# Patient Record
Sex: FEMALE | Race: AMERICAN INDIAN OR ALASKA NATIVE
[De-identification: names, ages, dates, MRNs, and addresses within clinical notes are randomized per-mention and may not be internally consistent; named-entity substitution may affect disease eponyms.]

---

## 2018-03-17 ENCOUNTER — HOSPITAL ENCOUNTER (EMERGENCY)
Dept: HOSPITAL 5 - ED | Age: 48
LOS: 1 days | Discharge: HOME | End: 2018-03-18
Payer: MEDICARE

## 2018-03-17 VITALS — SYSTOLIC BLOOD PRESSURE: 123 MMHG | DIASTOLIC BLOOD PRESSURE: 72 MMHG

## 2018-03-17 DIAGNOSIS — K21.9: ICD-10-CM

## 2018-03-17 DIAGNOSIS — Z91.040: ICD-10-CM

## 2018-03-17 DIAGNOSIS — J20.9: Primary | ICD-10-CM

## 2018-03-17 LAB
BASOPHILS # (AUTO): 0 K/MM3 (ref 0–0.1)
BASOPHILS NFR BLD AUTO: 0.5 % (ref 0–1.8)
BUN SERPL-MCNC: 9 MG/DL (ref 7–17)
BUN/CREAT SERPL: 13 %
CALCIUM SERPL-MCNC: 8.5 MG/DL (ref 8.4–10.2)
EOSINOPHIL # BLD AUTO: 0.2 K/MM3 (ref 0–0.4)
EOSINOPHIL NFR BLD AUTO: 3.4 % (ref 0–4.3)
HCT VFR BLD CALC: 30.5 % (ref 30.3–42.9)
HEMOLYSIS INDEX: 6
HGB BLD-MCNC: 9.6 GM/DL (ref 10.1–14.3)
LYMPHOCYTES # BLD AUTO: 2.2 K/MM3 (ref 1.2–5.4)
LYMPHOCYTES NFR BLD AUTO: 44.1 % (ref 13.4–35)
MCH RBC QN AUTO: 24 PG (ref 28–32)
MCHC RBC AUTO-ENTMCNC: 31 % (ref 30–34)
MCV RBC AUTO: 76 FL (ref 79–97)
MONOCYTES # (AUTO): 0.4 K/MM3 (ref 0–0.8)
MONOCYTES % (AUTO): 8.2 % (ref 0–7.3)
PLATELET # BLD: 427 K/MM3 (ref 140–440)
RBC # BLD AUTO: 4 M/MM3 (ref 3.65–5.03)

## 2018-03-17 PROCEDURE — 80048 BASIC METABOLIC PNL TOTAL CA: CPT

## 2018-03-17 PROCEDURE — 85025 COMPLETE CBC W/AUTO DIFF WBC: CPT

## 2018-03-17 PROCEDURE — 85379 FIBRIN DEGRADATION QUANT: CPT

## 2018-03-17 PROCEDURE — 84703 CHORIONIC GONADOTROPIN ASSAY: CPT

## 2018-03-17 PROCEDURE — 93005 ELECTROCARDIOGRAM TRACING: CPT

## 2018-03-17 PROCEDURE — 71045 X-RAY EXAM CHEST 1 VIEW: CPT

## 2018-03-17 PROCEDURE — 36415 COLL VENOUS BLD VENIPUNCTURE: CPT

## 2018-03-17 PROCEDURE — 84484 ASSAY OF TROPONIN QUANT: CPT

## 2018-03-17 PROCEDURE — 93010 ELECTROCARDIOGRAM REPORT: CPT

## 2018-03-17 PROCEDURE — 99285 EMERGENCY DEPT VISIT HI MDM: CPT

## 2018-03-17 NOTE — EMERGENCY DEPARTMENT REPORT
ED Chest Pain HPI





- General


Chief Complaint: Chest Pain


Stated Complaint: CHEST PAIN,SOB


Time Seen by Provider: 03/17/18 21:47


Source: patient


Mode of arrival: Ambulatory


Limitations: No Limitations





- History of Present Illness


Initial Comments: 





Patient is 47 years old female with no significant past medical history.  

Patient presented to the ER with left-sided chest pain sharp in nature 

associated with shortness of breath, pain does not radiate.  Patient denied any 

fever but admitted cough for the last 4 or 5 days.  Physician denied any nausea 

or vomiting.  No other symptoms.


MD Complaint: chest pain


-: Gradual


Onset: during rest


Pain Location: left chest


Pain Radiation: none


Quality: sharp


Consistency: intermittent


Other Symptoms: cough


Treatments Prior to Arrival: none





- Related Data


 Home Medications











 Medication  Instructions  Recorded  Confirmed  Last Taken


 


ALPRAZolam [Xanax TAB] 0.5 mg PO BID PRN 07/29/16 07/30/16 07/28/16


 


FLUoxetine [PROzac] 20 mg PO QDAY 07/29/16 07/30/16 07/29/16


 


Trazodone HCl 225 mg PO QHS 07/29/16 07/30/16 07/28/16








 Previous Rx's











 Medication  Instructions  Recorded  Last Taken  Type


 


traMADol [Ultram] 50 mg PO Q4HR PRN #20 tablet 07/29/16 Unknown Rx


 


Ketorolac [Toradol] 10 mg PO Q6H PRN #20 tablet 07/30/16 Unknown Rx











 Allergies











Allergy/AdvReac Type Severity Reaction Status Date / Time


 


latex Allergy  Hives Verified 03/17/18 20:34














Heart Score





- HEART Score


History: Slightly suspicious


EKG: Non-specific


Age: 45-65


Risk factors: No known risk factors


Troponin: < normal limit


HEART Score: 2





- Critical Actions


Critical Actions: 0-3 pts:0.9-1.7%risk of adverse cardiac event.Candidate for 

discharge





ED Review of Systems


ROS: 


Stated complaint: CHEST PAIN,SOB


Other details as noted in HPI





Comment: All other systems reviewed and negative


Constitutional: denies: chills, fever


Respiratory: cough, shortness of breath.  denies: orthopnea, SOB with exertion, 

SOB at rest


Cardiovascular: chest pain.  denies: palpitations, dyspnea on exertion


Gastrointestinal: denies: abdominal pain, nausea, vomiting, diarrhea, 

constipation, hematemesis


Neurological: denies: headache, weakness, numbness, paresthesias





ED Past Medical Hx





- Past Medical History


Previous Medical History?: Yes


Hx GERD: Yes


Hx Psychiatric Treatment: Yes (Bipolar, ADHD, Depression,)


Additional medical history: Bronchitis, panic attack, Insomnia





- Surgical History


Additional Surgical History: Breast Augmentation





- Social History


Smoking Status: Never Smoker


Substance Use Type: None





- Medications


Home Medications: 


 Home Medications











 Medication  Instructions  Recorded  Confirmed  Last Taken  Type


 


ALPRAZolam [Xanax TAB] 0.5 mg PO BID PRN 07/29/16 07/30/16 07/28/16 History


 


FLUoxetine [PROzac] 20 mg PO QDAY 07/29/16 07/30/16 07/29/16 History


 


Trazodone HCl 225 mg PO QHS 07/29/16 07/30/16 07/28/16 History


 


traMADol [Ultram] 50 mg PO Q4HR PRN #20 tablet 07/29/16 07/30/16 Unknown Rx


 


Ketorolac [Toradol] 10 mg PO Q6H PRN #20 tablet 07/30/16  Unknown Rx














ED Physical Exam





- General


Limitations: No Limitations


General appearance: alert, in no apparent distress





- Head


Head exam: Present: atraumatic, normocephalic





- Eye


Eye exam: Present: normal appearance, PERRL





- ENT


ENT exam: Present: normal exam, normal orophraynx, mucous membranes moist





- Respiratory


Respiratory exam: Present: normal lung sounds bilaterally.  Absent: respiratory 

distress, wheezes, rales, rhonchi, chest wall tenderness, accessory muscle use, 

decreased breath sounds, prolonged expiratory





- Cardiovascular


Cardiovascular Exam: Present: regular rate, normal rhythm, normal heart sounds





- GI/Abdominal


GI/Abdominal exam: Present: soft, normal bowel sounds.  Absent: distended, 

tenderness, guarding, rebound, rigid, organomegaly, mass, bruit, pulsatile mass

, hernia





- Extremities Exam


Extremities exam: Present: normal inspection, full ROM, normal capillary refill





- Back Exam


Back exam: Present: normal inspection, full ROM.  Absent: CVA tenderness (L)





- Neurological Exam


Neurological exam: Present: alert, oriented X3, CN II-XII intact, normal gait





- Skin


Skin exam: Present: warm, intact, normal color





ED Course


 Vital Signs











  03/17/18 03/17/18





  20:28 22:03


 


Temperature 98.6 F 98.2 F


 


Pulse Rate 77 64


 


Respiratory 20 16





Rate  


 


Blood Pressure 122/66 


 


Blood Pressure  123/72





[Left]  


 


O2 Sat by Pulse 100 100





Oximetry  














- Reevaluation(s)


Reevaluation #1: 





03/17/18 23:38


Patient stated that she is feeling much better.  No chest pain at this moment.  

2 sets of troponin is negative.  I believe this is most likely bronchitis given 

the recent history of cough and shortness of breath.  I will treat with 

Zithromax and Cheratussin.  Advised patient to follow up with her primary care 

physician in the next 2-3 days.  Patient also informed that she can come back 

if symptoms are not improving.





FIDEL score





- Fidel Score


Age > 65: (0) No


Aspirin use within the Past 7 Days: (0) No


3 or more CAD Risk Factors: (0) No


2 or more Angina events in past 24 hrs: (0) No


Known CAD with more than 50% Stenosis: (0) No


Elevated Cardiac Markers: (0) No


ST Deviation Greater than 0.5mm: (0) No


FIDEL Score: 0





ED Medical Decision Making





- Lab Data


Result diagrams: 


 03/17/18 20:37





 03/17/18 20:37





- EKG Data


-: EKG Interpreted by Me


EKG shows normal: sinus rhythm


Rate: bradycardia





- EKG Data


Interpretation: no acute changes





- Radiology Data


Radiology results: image reviewed


interpreted by me: 





Chest x-ray unremarkable for acute findings.


Critical care attestation.: 


If time is entered above; I have spent that time in minutes in the direct care 

of this critically ill patient, excluding procedure time.








ED Disposition


Clinical Impression: 


 Atypical chest pain, Acute bronchitis





Disposition: DC-01 TO HOME OR SELFCARE


Is pt being admited?: No


Condition: Stable


Instructions:  Chest Pain (ED), Acute Bronchitis (ED)

## 2018-03-19 NOTE — XRAY REPORT
FINAL REPORT



EXAM:  XR CHEST 1V AP



HISTORY:  chest pain 



TECHNIQUE:  Chest, portable



PRIORS:  None.



FINDINGS:  

The heart size is normal.



Mediastinal contours are normal.



Pulmonary vasculature is not congested.



The lungs are clear.



There are no pleural effusion seen.



There is no evidence of pneumothorax.



IMPRESSION:  

There is no acute abnormality identified.

## 2021-05-12 ENCOUNTER — HOSPITAL ENCOUNTER (EMERGENCY)
Dept: HOSPITAL 5 - ED | Age: 51
Discharge: HOME | End: 2021-05-12
Payer: MEDICARE

## 2021-05-12 VITALS — SYSTOLIC BLOOD PRESSURE: 122 MMHG | DIASTOLIC BLOOD PRESSURE: 66 MMHG

## 2021-05-12 DIAGNOSIS — Z79.899: ICD-10-CM

## 2021-05-12 DIAGNOSIS — G40.909: Primary | ICD-10-CM

## 2021-05-12 DIAGNOSIS — K21.9: ICD-10-CM

## 2021-05-12 DIAGNOSIS — C79.31: ICD-10-CM

## 2021-05-12 DIAGNOSIS — Z91.040: ICD-10-CM

## 2021-05-12 DIAGNOSIS — G91.9: ICD-10-CM

## 2021-05-12 DIAGNOSIS — F31.9: ICD-10-CM

## 2021-05-12 LAB
APTT BLD: 26.9 SEC. (ref 24.2–36.6)
BASOPHILS # (AUTO): 0.1 K/MM3 (ref 0–0.1)
BASOPHILS NFR BLD AUTO: 0.7 % (ref 0–1.8)
BUN SERPL-MCNC: 14 MG/DL (ref 7–17)
BUN/CREAT SERPL: 23 %
CALCIUM SERPL-MCNC: 8.4 MG/DL (ref 8.4–10.2)
EOSINOPHIL # BLD AUTO: 0 K/MM3 (ref 0–0.4)
EOSINOPHIL NFR BLD AUTO: 0.2 % (ref 0–4.3)
HCT VFR BLD CALC: 42.6 % (ref 30.3–42.9)
HEMOLYSIS INDEX: 5
HGB BLD-MCNC: 14.4 GM/DL (ref 10.1–14.3)
INR PPP: 1.08 (ref 0.87–1.13)
LYMPHOCYTES # BLD AUTO: 1.5 K/MM3 (ref 1.2–5.4)
LYMPHOCYTES NFR BLD AUTO: 16 % (ref 13.4–35)
MCHC RBC AUTO-ENTMCNC: 34 % (ref 30–34)
MCV RBC AUTO: 100 FL (ref 79–97)
MONOCYTES # (AUTO): 0.7 K/MM3 (ref 0–0.8)
MONOCYTES % (AUTO): 7.7 % (ref 0–7.3)
PLATELET # BLD: 197 K/MM3 (ref 140–440)
RBC # BLD AUTO: 4.25 M/MM3 (ref 3.65–5.03)

## 2021-05-12 PROCEDURE — 83880 ASSAY OF NATRIURETIC PEPTIDE: CPT

## 2021-05-12 PROCEDURE — 70450 CT HEAD/BRAIN W/O DYE: CPT

## 2021-05-12 PROCEDURE — 86850 RBC ANTIBODY SCREEN: CPT

## 2021-05-12 PROCEDURE — 96365 THER/PROPH/DIAG IV INF INIT: CPT

## 2021-05-12 PROCEDURE — 85610 PROTHROMBIN TIME: CPT

## 2021-05-12 PROCEDURE — 86901 BLOOD TYPING SEROLOGIC RH(D): CPT

## 2021-05-12 PROCEDURE — 85670 THROMBIN TIME PLASMA: CPT

## 2021-05-12 PROCEDURE — 96361 HYDRATE IV INFUSION ADD-ON: CPT

## 2021-05-12 PROCEDURE — 99284 EMERGENCY DEPT VISIT MOD MDM: CPT

## 2021-05-12 PROCEDURE — 85730 THROMBOPLASTIN TIME PARTIAL: CPT

## 2021-05-12 PROCEDURE — 96375 TX/PRO/DX INJ NEW DRUG ADDON: CPT

## 2021-05-12 PROCEDURE — 82550 ASSAY OF CK (CPK): CPT

## 2021-05-12 PROCEDURE — 85025 COMPLETE CBC W/AUTO DIFF WBC: CPT

## 2021-05-12 PROCEDURE — 36415 COLL VENOUS BLD VENIPUNCTURE: CPT

## 2021-05-12 PROCEDURE — 80048 BASIC METABOLIC PNL TOTAL CA: CPT

## 2021-05-12 PROCEDURE — 82553 CREATINE MB FRACTION: CPT

## 2021-05-12 PROCEDURE — 86900 BLOOD TYPING SEROLOGIC ABO: CPT

## 2021-05-12 NOTE — EMERGENCY DEPARTMENT REPORT
ED General Adult HPI





- General


Chief complaint: Seizure


Stated complaint: SEIZURES


Time Seen by Provider: 05/12/21 06:46


Source: EMS


Mode of arrival: Stretcher


Limitations: Altered Mental Status





- History of Present Illness


Initial comments: 


50-year-old female who arrives with active ictus/tonic-clonic movements.  

Apparently she was given "10 mg" of Versed by EMS.  This must have been 

intranasal if the milligram strength is accurate.  She is here with her 

caretaker who I am informed is actually not related to her.  He states that the 

patient was able to recognize him yesterday and to indicate when she needed 

help.  He states that she has been bedridden for some time.  Apparently she 

started seizing for the first time this morning.  The gentleman is not a good 

historian.  He indicated to me that she had to brain surgeries at [facility].  I 

later found out that she was operated on at [facility] [facility].  In any case, he states 

that he has been trying to take her off morphine and I believe Depakote which 

she was placed on prophylactically.  








-: Sudden


Associated Symptoms: other (Inapplicable)





- Related Data


                                Home Medications











 Medication  Instructions  Recorded  Confirmed  Last Taken


 


ALPRAZolam [Xanax TAB] 0.5 mg PO BID PRN 07/29/16 07/30/16 07/28/16


 


FLUoxetine [PROzac] 20 mg PO QDAY 07/29/16 07/30/16 07/29/16


 


Trazodone HCl 225 mg PO QHS 07/29/16 07/30/16 07/28/16








                                  Previous Rx's











 Medication  Instructions  Recorded  Last Taken  Type


 


traMADoL [Ultram] 50 mg PO Q4HR PRN #20 tablet 07/29/16 Unknown Rx


 


Ketorolac [Toradol] 10 mg PO Q6H PRN #20 tablet 07/30/16 Unknown Rx


 


Azithromycin [Zithromax Z-RONIT] 250 mg PO DAILY 1 Days  tab 03/17/18 Unknown Rx


 


guaiFENesin/CODEINE [Robitussin AC] 10 ml PO TID PRN #100 ml 03/17/18 Unknown Rx











                                    Allergies











Allergy/AdvReac Type Severity Reaction Status Date / Time


 


latex Allergy  Hives Verified 03/17/18 20:34














ED Review of Systems


ROS: 


Stated complaint: SEIZURES


Other details as noted in HPI





Comment: Unobtainable due to pts medical conditions





ED Past Medical Hx





- Past Medical History


Hx GERD: Yes


Hx Psychiatric Treatment: Yes (Bipolar, ADHD, Depression,)


Additional medical history: Bronchitis, panic attack, Insomnia.  Brain 

metastases per Dr. Reinoso





- Surgical History


Additional Surgical History: Breast Augmentation.  Craniotomy x2





- Social History


Smoking Status: Unknown if ever smoked





- Medications


Home Medications: 


                                Home Medications











 Medication  Instructions  Recorded  Confirmed  Last Taken  Type


 


ALPRAZolam [Xanax TAB] 0.5 mg PO BID PRN 07/29/16 07/30/16 07/28/16 History


 


FLUoxetine [PROzac] 20 mg PO QDAY 07/29/16 07/30/16 07/29/16 History


 


Trazodone HCl 225 mg PO QHS 07/29/16 07/30/16 07/28/16 History


 


traMADoL [Ultram] 50 mg PO Q4HR PRN #20 tablet 07/29/16 07/30/16 Unknown Rx


 


Ketorolac [Toradol] 10 mg PO Q6H PRN #20 tablet 07/30/16  Unknown Rx


 


Azithromycin [Zithromax Z-RONIT] 250 mg PO DAILY 1 Days  tab 03/17/18  Unknown Rx


 


guaiFENesin/CODEINE [Robitussin AC] 10 ml PO TID PRN #100 ml 03/17/18  Unknown 

Rx














ED Physical Exam





- General


Limitations: Other (Postictal)


General appearance: obtunded





- Head


Head exam: Present: atraumatic, normocephalic





- Eye


Eye exam: Present: normal appearance.  Absent: scleral icterus





- ENT


ENT exam: Present: mucous membranes moist





- Neck


Neck exam: Present: normal inspection





- Respiratory


Respiratory exam: Present: normal lung sounds bilaterally.  Absent: respiratory 

distress





- Cardiovascular


Cardiovascular Exam: Present: regular rate, normal rhythm.  Absent: systolic 

murmur, diastolic murmur, rubs, gallop





- GI/Abdominal


GI/Abdominal exam: Present: soft, normal bowel sounds.  Absent: distended, 

tenderness, guarding, rebound





- Extremities Exam


Extremities exam: Present: normal inspection





- Back Exam


Back exam: Present: normal inspection





- Neurological Exam


Neurological exam: Present: altered





- Skin


Skin exam: Present: warm, dry, intact, normal color.  Absent: rash





ED Course


                                   Vital Signs











  05/12/21 05/12/21 05/12/21





  06:25 06:28 06:30


 


Temperature 98.9 F  


 


Pulse Rate 135 H 134 H 135 H


 


Respiratory 20 23 15





Rate   


 


Blood Pressure 131/80  131/80


 


Blood Pressure 131/80  





[Left]   


 


O2 Sat by Pulse 100 100 100





Oximetry   














  05/12/21 05/12/21 05/12/21





  07:00 07:30 08:16


 


Temperature   


 


Pulse Rate 135 H 135 H 134 H


 


Respiratory 25 H 17 23





Rate   


 


Blood Pressure 133/75 142/69 133/75


 


Blood Pressure   





[Left]   


 


O2 Sat by Pulse 100 97 96





Oximetry   














- Reevaluation(s)


Reevaluation #1: 


Patient was given Ativan and then Keppra.  This resolved her seizing.  She was 

given Decadron.  I initially placed a call to [facility].  [facility] co

nfirmed that she was never seen at their facility.  I attempted to call the 

patient's granddaughter but got voicemail.  Later the nurse was able to get the 

granddaughter.  She stated that the patient had been operated on at [facility].  

She confirmed her end-stage condition.





I participated with the conference call with the patient's family.  They clearly

 wanted the patient return to hospice.  We discussed the possibility of 

decompressive neurosurgery, i.e., ventriculostomy followed by  shunt if 

possible.  They understood the procedure.  They stated that they definitely did 

not want did not want the patient to go through any further surgery.  They were 

aware of her grim prognosis and likely near term mortality risk.  They decided 

to have the patient sent back to hospice. 





I discussed inpatient hospice placement with detox.  They stated they could 

arrange that.  Their hospice doctor is Dr. Reinoso.  He came to the emergency 

department.  He stated that the patient could be returned home and they would 

advance her hospice care at home.


05/12/21 13:04








ED Medical Decision Making





- Lab Data


Result diagrams: 


                                 05/12/21 06:51





                                 05/12/21 06:51








                         Laboratory Results - last 24 hr











  05/12/21 05/12/21 05/12/21





  06:51 06:51 06:51


 


WBC  9.4  


 


RBC  4.25  


 


Hgb  14.4 H  


 


Hct  42.6  


 


MCV  100 H  


 


MCH  34 H  


 


MCHC  34  


 


RDW  14.3  


 


Plt Count  197  


 


Lymph % (Auto)  16.0  


 


Mono % (Auto)  7.7 H  


 


Eos % (Auto)  0.2  


 


Baso % (Auto)  0.7  


 


Lymph # (Auto)  1.5  


 


Mono # (Auto)  0.7  


 


Eos # (Auto)  0.0  


 


Baso # (Auto)  0.1  


 


Seg Neutrophils %  75.4 H  


 


Seg Neutrophils #  7.1  


 


PT   13.8 


 


INR   1.08 


 


APTT   26.9 


 


Thrombin Time   


 


Sodium    149 H


 


Potassium    3.3 L


 


Chloride    110.2 H


 


Carbon Dioxide    29


 


Anion Gap    13


 


BUN    14


 


Creatinine    0.6


 


Estimated GFR    > 60


 


BUN/Creatinine Ratio    23


 


Glucose    149 H


 


Calcium    8.4


 


Total Creatine Kinase   


 


CK-MB (CK-2)   


 


CK-MB (CK-2) Rel Index   


 


NT-Pro-B Natriuret Pep   


 


Blood Type   


 


Antibody Screen   














  05/12/21 05/12/21 05/12/21





  06:51 06:51 06:51


 


WBC   


 


RBC   


 


Hgb   


 


Hct   


 


MCV   


 


MCH   


 


MCHC   


 


RDW   


 


Plt Count   


 


Lymph % (Auto)   


 


Mono % (Auto)   


 


Eos % (Auto)   


 


Baso % (Auto)   


 


Lymph # (Auto)   


 


Mono # (Auto)   


 


Eos # (Auto)   


 


Baso # (Auto)   


 


Seg Neutrophils %   


 


Seg Neutrophils #   


 


PT   


 


INR   


 


APTT   


 


Thrombin Time  17.6  


 


Sodium   


 


Potassium   


 


Chloride   


 


Carbon Dioxide   


 


Anion Gap   


 


BUN   


 


Creatinine   


 


Estimated GFR   


 


BUN/Creatinine Ratio   


 


Glucose   


 


Calcium   


 


Total Creatine Kinase   41 


 


CK-MB (CK-2)   2.1 


 


CK-MB (CK-2) Rel Index   5.1 H 


 


NT-Pro-B Natriuret Pep   25.24 


 


Blood Type    O NEGATIVE


 


Antibody Screen    Negative














- Radiology Data


 IMPRESSION:   


 1. Recent surgery with residual tumor versus mild hemorrhage right frontal 

region. Residual tumor 


is favored.  


 2. Extensive edema with compression of the frontal horns of the lateral 

ventricles and dilatation 


of the remainder of the ventricular system.  


 


 Signer Name: Terrell Cox MD   








Critical Care Time: Yes


Critical care time in (mins) excluding proc time.: 90


Critical care attestation.: 


If time is entered above; I have spent that time in minutes in the direct care 

of this critically ill patient, excluding procedure time.








ED Disposition


Clinical Impression: 


 Seizure, Brain metastasis





Hydrocephalus


Qualifiers:


 Hydrocephalus type: unspecified Qualified Code(s): G91.9 - Hydrocephalus, 

unspecified





Disposition: DC-01 TO HOME OR SELFCARE


Is pt being admited?: No


Does the pt Need Aspirin: No


Condition: Stable


Additional Instructions: 


Home hospice care.


Referrals: 


PRIMARY CARE,MD [Primary Care Provider] - 3-5 Days


Time of Disposition: 13:08

## 2021-05-12 NOTE — CAT SCAN REPORT
CT head without contrast



INDICATION : Brain Tumor Seizure.



TECHNIQUE:  Axial imaging performed from the skull apex through the skull base without the use of con
trast.  All CT scans at this location are performed using CT dose reduction for ALARA by means of aut
omated exposure control. 



COMPARISON:  None



FINDINGS:  



Parenchyma: High density at the anterior left frontal lobe (approximately 2.3 cm) compatible with mil
d hemorrhage versus residual tumor. Prominent edema is seen throughout the frontal regions bilaterall
y extending into the basal ganglia and temporal region on the left. Encephalomalacia left frontal reg
ion.



Ventricles:  Impression at the frontal horns with mild dilatation of the body and occipital horns of 
the lateral ventricles.  



Soft tissues:  Soft tissues including the orbits appear normal.   



Bones:  Recent craniotomy bifrontal region.  



Sinuses:  Sinuses and mastoid air cells are clear.





IMPRESSION: 

1. Recent surgery with residual tumor versus mild hemorrhage right frontal region. Residual tumor is 
favored.

2. Extensive edema with compression of the frontal horns of the lateral ventricles and dilatation of 
the remainder of the ventricular system.



Signer Name: Terrell Cox MD 

Signed: 5/12/2021 7:30 AM

Workstation Name: [id number]

## 2021-06-11 ENCOUNTER — HOSPITAL ENCOUNTER (EMERGENCY)
Dept: HOSPITAL 5 - ED | Age: 51
Discharge: HOME | End: 2021-06-11
Payer: MEDICARE

## 2021-06-11 VITALS — DIASTOLIC BLOOD PRESSURE: 68 MMHG | SYSTOLIC BLOOD PRESSURE: 100 MMHG

## 2021-06-11 DIAGNOSIS — E87.0: ICD-10-CM

## 2021-06-11 DIAGNOSIS — F31.9: ICD-10-CM

## 2021-06-11 DIAGNOSIS — R56.9: Primary | ICD-10-CM

## 2021-06-11 DIAGNOSIS — Z79.899: ICD-10-CM

## 2021-06-11 DIAGNOSIS — K21.9: ICD-10-CM

## 2021-06-11 DIAGNOSIS — Z98.890: ICD-10-CM

## 2021-06-11 DIAGNOSIS — Z91.040: ICD-10-CM

## 2021-06-11 DIAGNOSIS — I62.9: ICD-10-CM

## 2021-06-11 LAB
BASOPHILS # (AUTO): 0.1 K/MM3 (ref 0–0.1)
BASOPHILS NFR BLD AUTO: 0.5 % (ref 0–1.8)
BUN SERPL-MCNC: 22 MG/DL (ref 7–17)
BUN/CREAT SERPL: 13 %
CALCIUM SERPL-MCNC: 10.1 MG/DL (ref 8.4–10.2)
EOSINOPHIL # BLD AUTO: 0 K/MM3 (ref 0–0.4)
EOSINOPHIL NFR BLD AUTO: 0.1 % (ref 0–4.3)
HCT VFR BLD CALC: 47.9 % (ref 30.3–42.9)
HEMOLYSIS INDEX: 209
HGB BLD-MCNC: 15.6 GM/DL (ref 10.1–14.3)
LYMPHOCYTES # BLD AUTO: 1 K/MM3 (ref 1.2–5.4)
LYMPHOCYTES NFR BLD AUTO: 8.3 % (ref 13.4–35)
MCHC RBC AUTO-ENTMCNC: 33 % (ref 30–34)
MCV RBC AUTO: 103 FL (ref 79–97)
MONOCYTES # (AUTO): 1.1 K/MM3 (ref 0–0.8)
MONOCYTES % (AUTO): 8.6 % (ref 0–7.3)
PLATELET # BLD: 198 K/MM3 (ref 140–440)
RBC # BLD AUTO: 4.67 M/MM3 (ref 3.65–5.03)

## 2021-06-11 PROCEDURE — 70450 CT HEAD/BRAIN W/O DYE: CPT

## 2021-06-11 PROCEDURE — 96374 THER/PROPH/DIAG INJ IV PUSH: CPT

## 2021-06-11 PROCEDURE — 85025 COMPLETE CBC W/AUTO DIFF WBC: CPT

## 2021-06-11 PROCEDURE — 93005 ELECTROCARDIOGRAM TRACING: CPT

## 2021-06-11 PROCEDURE — 96361 HYDRATE IV INFUSION ADD-ON: CPT

## 2021-06-11 PROCEDURE — 99284 EMERGENCY DEPT VISIT MOD MDM: CPT

## 2021-06-11 PROCEDURE — 80048 BASIC METABOLIC PNL TOTAL CA: CPT

## 2021-06-11 PROCEDURE — 36415 COLL VENOUS BLD VENIPUNCTURE: CPT

## 2021-06-11 NOTE — EMERGENCY DEPARTMENT REPORT
HPI





- General


Chief Complaint: Seizure


Time Seen by Provider: 06/11/21 02:19





- HPI


HPI: 





Room 25





The patient is a 51-year-old female present with a chief complaint of seizures. 

Per EMS patient has a history of brain cancer status post surgery with history 

of seizures on home hospice.  Per EMS the patient had multiple seizures at home 

and was sent to the ED by the hospice physician to be loaded with Keppra and 

returned to hospice.  The patient is DNR.  The patient was given Ativan at home 

and administered Versed by EMS prior to arrival.  Upon arrival to the ED the 

patient is not seizing and currently postictal





ED Past Medical Hx





- Past Medical History


Hx GERD: Yes


Hx Psychiatric Treatment: Yes (Bipolar, ADHD, Depression,)


Additional medical history: Bronchitis, panic attack, Insomnia.  Brain 

metastases per Dr. Reinoso





- Surgical History


Additional Surgical History: Breast Augmentation.  Craniotomy x2





- Family History


Family history: no significant





- Social History


Smoking Status: Unknown if ever smoked





- Medications


Home Medications: 


                                Home Medications











 Medication  Instructions  Recorded  Confirmed  Last Taken  Type


 


ALPRAZolam [Xanax TAB] 0.5 mg PO BID PRN 07/29/16 06/11/21 06/10/21 History


 


levETIRAcetam [Keppra TAB] 500 mg PO BID 06/11/21 06/11/21 06/10/21 History














ED Review of Systems


ROS: 


Stated complaint: SEIZURES


Other details as noted in HPI





Comment: Unobtainable due to pts medical conditions





Physical Exam





- Physical Exam


Physical Exam: 





GENERAL: The patient is well-developed well-nourished female lying on stretcher 

postictal. []


HEENT: Normocephalic.  Atraumatic.  


NECK: Supple.  Trachea midline


CHEST/LUNGS: Clear to auscultation.  There is no respiratory distress noted.


HEART/CARDIOVASCULAR: Regular.  There is tachycardia.  There is no gallop rub or

 murmur.


ABDOMEN: Abdomen is soft, nontender.  Patient has normal bowel sounds.  There is

 no abdominal distention.


SKIN: There is no rash.  There is no edema.  There is no diaphoresis.


NEURO: The patient is postictal


MUSCULOSKELETAL: There is no evidence of acute injury.





ED Course





- Consultations


Consultation #1: 





06/11/21 05:44


Case, CT findings and labs discussed with patient's hospice physician Dr. Moises

 Kemar-states patient may be discharged back to home hospice.  He confirms that 

the patient is  DNR.  He states that family has any questions he can be reached 

at [phone]





ED Medical Decision Making





- Lab Data


Result diagrams: 


                                 06/11/21 02:37





                                 06/11/21 02:37





- Differential Diagnosis


Seizures


Critical care attestation.: 


If time is entered above; I have spent that time in minutes in the direct care 

of this critically ill patient, excluding procedure time.








ED Disposition


Clinical Impression: 


 Seizure, Hypernatremia, Intracranial hemorrhage





Disposition: DC-01 TO HOME OR SELFCARE


Is pt being admited?: No


Does the pt Need Aspirin: No


Condition: Poor


Instructions:  Seizure, Adult


Time of Disposition: 05:46

## 2021-06-11 NOTE — CAT SCAN REPORT
CT head/brain wo con 



INDICATION / CLINICAL INFORMATION:

Seizure, Hx of brain cancer.



TECHNIQUE:

Axial CT imaging of brain was obtained without contrast. Coronal and sagittal reformatted imaging obt
ained and reviewed.  All CT scans at this location are performed using CT dose reduction for ALARA by
 means of automated exposure control. 



COMPARISON:

Prior head CT 5/12/2021



FINDINGS:

As seen on prior head CT of 5/12/2021, there is extensive edema throughout both frontal lobes and tem
poral lobes with significant compression of the frontal horns of the ventricular system bilaterally. 
There is high density area in the left frontal lobe measuring approximately 18 mm that was noted on t
he recent head CT and is grossly unchanged in appearance. It is unclear whether this represented a sm
all amount of residual hemorrhage or tumor. I do not see any extra-axial fluid collection. The latera
l and occipital horns of the ventricular system are slightly dilated unchanged in appearance from janis
or head CT. However, there is a small amount of intraventricular hemorrhage identified in the occipit
al horn of the left lateral ventricle which is a new finding.



The visualized paranasal sinuses are well aerated and clear. Evidence of multiple craniotomies throug
hout the anterior aspect of the calvarium.



IMPRESSION:

1. New small area of intraventricular hemorrhage within the occipital horn of the left lateral ventri
marj.

2. No other significant interval change in the overall appearance of the head CT scan with persistent
 extensive edema throughout the frontal lobes and temporal lobes bilaterally. There continues to be s
evere compression of the frontal horns of both lateral ventricles with mild dilatation of the occipit
al horns and temporal horns of the ventricles bilaterally.







CRITICAL RESULT:

Time of Discovery: 0158 CST

Time of Communication: 0213 CST

Licensed Practitioner Receiving Report: Dr. Baird 

Read Back Performed: Yes.







Signer Name: Fiorella Alcazar MD 

Signed: 6/11/2021 3:14 AM

Workstation Name: [email or web address]

## 2021-06-17 NOTE — ELECTROCARDIOGRAPH REPORT
[facility]

                                       

Test Date:    2021               Test Time:    02:19:28

Pat Name:     JUAN FAGAN          Department:   

Patient ID:   [id number]          Room:          

Gender:       F                        Technician:   NURSE

:          1970               Requested By: YANET ECHAVARRIA

Order Number: [id number]              Reading MD:   Karlene Brandon

                                 Measurements

Intervals                              Axis          

Rate:         144                      P:            85

VA:           117                      QRS:          68

QRSD:         61                       T:            -87

QT:           256                                    

QTc:          397                                    

                           Interpretive Statements

Sinus tachycardia

ST depression consider inferolateral ischemia

No previous ECG available for comparison

Electronically Signed On 2021 11:12:20 EDT by Karlene Brandon